# Patient Record
Sex: FEMALE | Race: WHITE | URBAN - METROPOLITAN AREA
[De-identification: names, ages, dates, MRNs, and addresses within clinical notes are randomized per-mention and may not be internally consistent; named-entity substitution may affect disease eponyms.]

---

## 2017-06-20 ENCOUNTER — IMPORTED ENCOUNTER (OUTPATIENT)
Dept: URBAN - METROPOLITAN AREA CLINIC 50 | Facility: CLINIC | Age: 77
End: 2017-06-20

## 2017-06-21 ENCOUNTER — IMPORTED ENCOUNTER (OUTPATIENT)
Dept: URBAN - METROPOLITAN AREA CLINIC 50 | Facility: CLINIC | Age: 77
End: 2017-06-21

## 2019-02-08 ENCOUNTER — IMPORTED ENCOUNTER (OUTPATIENT)
Dept: URBAN - METROPOLITAN AREA CLINIC 50 | Facility: CLINIC | Age: 79
End: 2019-02-08

## 2020-03-24 ENCOUNTER — IMPORTED ENCOUNTER (OUTPATIENT)
Dept: URBAN - METROPOLITAN AREA CLINIC 50 | Facility: CLINIC | Age: 80
End: 2020-03-24

## 2021-04-06 ENCOUNTER — IMPORTED ENCOUNTER (OUTPATIENT)
Dept: URBAN - METROPOLITAN AREA CLINIC 50 | Facility: CLINIC | Age: 81
End: 2021-04-06

## 2021-04-06 ENCOUNTER — PREPPED CHART (OUTPATIENT)
Dept: URBAN - METROPOLITAN AREA CLINIC 52 | Facility: CLINIC | Age: 81
End: 2021-04-06

## 2021-04-18 ASSESSMENT — TONOMETRY
OS_IOP_MMHG: 15
OD_IOP_MMHG: 14
OD_IOP_MMHG: 15
OD_IOP_MMHG: 15
OS_IOP_MMHG: 15
OS_IOP_MMHG: 13
OS_IOP_MMHG: 15
OD_IOP_MMHG: 13

## 2021-04-18 ASSESSMENT — VISUAL ACUITY
OS_OTHER: 20/25. 20/40.
OD_SC: 20/400
OD_BAT: 20/30
OD_CC: J1
OD_OTHER: 20/30. 20/40.
OD_CC: J1@ 18 IN
OS_SC: 20/30-1
OS_CC: J3
OD_OTHER: 20/200. >400.
OS_CC: J1@ 16 IN
OD_BAT: 20/200
OS_BAT: 20/200
OS_SC: 20/30+2
OD_PH: 20/30
OD_CC: J1@ 16 IN
OD_PH: 20/30
OS_OTHER: 20/200. >400.
OS_CC: J1
OD_SC: 20/80
OD_CC: J3
OS_CC: J1@ 18 IN
OS_SC: 20/30-2
OD_SC: 20/200
OD_PH: 20/30-
OS_BAT: 20/25

## 2021-04-29 ENCOUNTER — PROBLEM (OUTPATIENT)
Dept: URBAN - METROPOLITAN AREA CLINIC 49 | Facility: CLINIC | Age: 81
End: 2021-04-29

## 2021-04-29 DIAGNOSIS — H04.123: ICD-10-CM

## 2021-04-29 DIAGNOSIS — S05.02XA: ICD-10-CM

## 2021-04-29 PROCEDURE — 92012 INTRM OPH EXAM EST PATIENT: CPT

## 2021-04-29 RX ORDER — OFLOXACIN 3 MG/ML: 1 SOLUTION OPHTHALMIC

## 2021-04-29 ASSESSMENT — VISUAL ACUITY
OS_SC: 20/25-2
OD_PH: 20/25
OD_SC: J1
OD_SC: 20/70

## 2021-04-29 ASSESSMENT — TONOMETRY
OD_IOP_MMHG: 12
OS_IOP_MMHG: 12

## 2021-04-29 NOTE — PATIENT DISCUSSION
Educated patient on today's findings. 2 mm Abrasion @ 12:30-2:30 extends along peripheral edge w/ no underlying infiltrate. Educated patient of importance of follow up care compliance and medication compliance. BCL placed today. Acuvue [place] 8.4 14.0 PLANO. RTC 1 day for BCL removal and follow up. Start Ofloxacin QID and AFT.

## 2021-04-30 ENCOUNTER — FOLLOW UP (OUTPATIENT)
Dept: URBAN - METROPOLITAN AREA CLINIC 49 | Facility: CLINIC | Age: 81
End: 2021-04-30

## 2021-04-30 DIAGNOSIS — S05.02XA: ICD-10-CM

## 2021-04-30 DIAGNOSIS — H04.123: ICD-10-CM

## 2021-04-30 PROCEDURE — 92012 INTRM OPH EXAM EST PATIENT: CPT

## 2021-04-30 RX ORDER — OFLOXACIN 3 MG/ML: 1 SOLUTION OPHTHALMIC

## 2021-04-30 ASSESSMENT — VISUAL ACUITY
OD_SC: 20/200
OU_SC: J1+
OD_SC: J1+
OS_PH: 20/30
OS_SC: 20/30-2

## 2021-04-30 ASSESSMENT — TONOMETRY
OS_IOP_MMHG: 13
OD_IOP_MMHG: 14

## 2021-04-30 NOTE — PATIENT DISCUSSION
Educated patient on today's findings. Patchy, nearly resolved abrasion @ 12:30-2:30 extends along peripheral edge w/ no underlying infiltrate. Educated patient of importance of follow up care compliance and medication compliance. BCL removed by AR. Continue Ofloxacin QID until follow up in 4 days. Continue AFT 4-6 times per day and Gel drop QHS. Patient expressed verbal understanding.

## 2021-05-03 ASSESSMENT — TONOMETRY
OS_IOP_MMHG: 13
OD_IOP_MMHG: 13

## 2021-05-03 ASSESSMENT — VISUAL ACUITY
OD_SC: 20/400
OS_SC: 20/30-2
OD_PH: 20/30

## 2021-05-04 ENCOUNTER — FOLLOW UP (OUTPATIENT)
Dept: URBAN - METROPOLITAN AREA CLINIC 49 | Facility: CLINIC | Age: 81
End: 2021-05-04

## 2021-05-04 DIAGNOSIS — S05.02XD: ICD-10-CM

## 2021-05-04 PROCEDURE — 92012 INTRM OPH EXAM EST PATIENT: CPT

## 2021-05-04 ASSESSMENT — VISUAL ACUITY
OD_SC: 20/200
OU_SC: 20/40
OS_SC: 20/40
OU_SC: J1+/-

## 2021-05-04 ASSESSMENT — TONOMETRY
OD_IOP_MMHG: 13
OS_IOP_MMHG: 14

## 2021-05-04 NOTE — PATIENT DISCUSSION
Educated patient on today's findings. Patchy, resolved abrasion @ 12:30-2:30 extends along peripheral edge w/ no underlying infiltrate. Educated patient of importance of artificial tears use. Discontinue Ofloxacin. Continue AFT 4-6 times per day and Gel drop QHS. Patient expressed verbal understanding. Keep annual exam appointment for next year.